# Patient Record
Sex: MALE | Race: WHITE
[De-identification: names, ages, dates, MRNs, and addresses within clinical notes are randomized per-mention and may not be internally consistent; named-entity substitution may affect disease eponyms.]

---

## 2020-05-05 NOTE — ULT
ABDOMINAL ULTRASOUND



HISTORY:

Lower abdominal pain and diarrhea



FINDINGS:



Liver: At the upper limits of normal in size measuring 17 cm in craniocaudal dimensions. Mildly coars
ened echotexture is present which may be attributable to fatty infiltration. No focal hepatic

lesion is seen.

Gallbladder: No gallbladder calculi are visualized. There is no gallbladder wall thickening or perich
olecystic fluid.



Common duct: Common duct is normal in caliber for patient's age measuring 0.6 cm in diameter.

Pancreas: Not well evaluated due to shadowing from bowel gas, but where visualized, the pancreas demo
nstrates a grossly normal sonographic appearance.

IVC: Limited visualized IVC has a normal sonographic appearance.

Aorta: Proximal abdominal aorta is obscured due to shadowing from bowel gas. The mid and distal abdom
inal aorta are normal in caliber.

Spleen: Within normal limits.

Kidneys: Kidneys demonstrate a normal sonographic appearance bilaterally with each kidney measuring a
pproximately 12 cm in length.



IMPRESSION:



1. Mildly coarsened echotexture of the liver suggesting fatty infiltration.

2. No gallbladder calculus is visualized, and the common duct is normal in caliber for the patient's 
age.



Reported By: Edu Cordoba 

Electronically Signed:  5/5/2020 8:27 AM

## 2020-06-05 NOTE — CT
CT ABDOMEN AND PELVIS WITH ORAL AND IV CONTRAST:

 

HISTORY: 

Lower abdominal pain and change in bowel habits.

 

COMPARISON: 

None.

 

FINDINGS: 

The lung bases are unremarkable.  No calcified gallstones are seen.  The liver, spleen, pancreas, adr
enal glands, and kidneys are normal.  No free air, free fluid, or lymphadenopathy is seen in the abdo
men or pelvis.

 

The small bowel loops are not abnormally dilated.  A normal-appearing appendix is present.  There is 
fecal material in the colon.  There are vascular calcifications without evidence of aneurysmal dilata
tion of the abdominal aorta.  There are degenerative changes in the spine.

 

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  There are artifacts from these metallic arthroplasties
 resulting in inadequate evaluation of the pelvic contents.  A penile pump is seen.  There are small 
fat-containing inguinal herniae.

 

IMPRESSION: 

No significant abnormalities are identified.

 

POS: [facility]

## 2021-08-29 ENCOUNTER — HOSPITAL ENCOUNTER (OUTPATIENT)
Dept: HOSPITAL 92 - CSHERS | Age: 76
Setting detail: OBSERVATION
LOS: 1 days | Discharge: HOME | End: 2021-08-30
Attending: HOSPITALIST | Admitting: FAMILY MEDICINE
Payer: MEDICARE

## 2021-08-29 VITALS — BODY MASS INDEX: 29 KG/M2

## 2021-08-29 DIAGNOSIS — Z79.01: ICD-10-CM

## 2021-08-29 DIAGNOSIS — Z85.46: ICD-10-CM

## 2021-08-29 DIAGNOSIS — Z79.890: ICD-10-CM

## 2021-08-29 DIAGNOSIS — Z86.73: ICD-10-CM

## 2021-08-29 DIAGNOSIS — E03.9: ICD-10-CM

## 2021-08-29 DIAGNOSIS — Z88.2: ICD-10-CM

## 2021-08-29 DIAGNOSIS — E87.6: ICD-10-CM

## 2021-08-29 DIAGNOSIS — I48.91: ICD-10-CM

## 2021-08-29 DIAGNOSIS — I10: ICD-10-CM

## 2021-08-29 DIAGNOSIS — N30.41: Primary | ICD-10-CM

## 2021-08-29 DIAGNOSIS — Z79.82: ICD-10-CM

## 2021-08-29 DIAGNOSIS — Z79.899: ICD-10-CM

## 2021-08-29 DIAGNOSIS — R33.8: ICD-10-CM

## 2021-08-29 PROCEDURE — 51703 INSERT BLADDER CATH COMPLEX: CPT

## 2021-08-29 PROCEDURE — G0378 HOSPITAL OBSERVATION PER HR: HCPCS

## 2021-08-29 PROCEDURE — 85025 COMPLETE CBC W/AUTO DIFF WBC: CPT

## 2021-08-29 PROCEDURE — 96375 TX/PRO/DX INJ NEW DRUG ADDON: CPT

## 2021-08-29 PROCEDURE — 80048 BASIC METABOLIC PNL TOTAL CA: CPT

## 2021-08-29 PROCEDURE — 96374 THER/PROPH/DIAG INJ IV PUSH: CPT

## 2021-08-30 VITALS — TEMPERATURE: 98.3 F | SYSTOLIC BLOOD PRESSURE: 121 MMHG | DIASTOLIC BLOOD PRESSURE: 75 MMHG

## 2021-08-30 LAB
ANION GAP SERPL CALC-SCNC: 11 MMOL/L (ref 10–20)
BASOPHILS # BLD AUTO: 0 10X3/UL (ref 0–0.2)
BASOPHILS NFR BLD AUTO: 0.2 % (ref 0–2)
BUN SERPL-MCNC: 15 MG/DL (ref 8.4–25.7)
CALCIUM SERPL-MCNC: 9.4 MG/DL (ref 7.8–10.44)
CHLORIDE SERPL-SCNC: 99 MMOL/L (ref 98–107)
CO2 SERPL-SCNC: 28 MMOL/L (ref 23–31)
CREAT CL PREDICTED SERPL C-G-VRATE: 116 ML/MIN (ref 70–130)
EOSINOPHIL # BLD AUTO: 0.2 10X3/UL (ref 0–0.5)
EOSINOPHIL NFR BLD AUTO: 2.1 % (ref 0–6)
GLUCOSE SERPL-MCNC: 125 MG/DL (ref 83–110)
HGB BLD-MCNC: 13.5 G/DL (ref 13.5–17.5)
LYMPHOCYTES NFR BLD AUTO: 11.8 % (ref 18–47)
MCH RBC QN AUTO: 30.6 PG (ref 27–33)
MCV RBC AUTO: 89.6 FL (ref 81.2–95.1)
MONOCYTES # BLD AUTO: 1.1 10X3/UL (ref 0–1.1)
MONOCYTES NFR BLD AUTO: 10.7 % (ref 0–10)
NEUTROPHILS # BLD AUTO: 8 10X3/UL (ref 1.5–8.4)
NEUTROPHILS NFR BLD AUTO: 74.7 % (ref 40–75)
PLATELET # BLD AUTO: 220 10X3/UL (ref 150–450)
POTASSIUM SERPL-SCNC: 3.1 MMOL/L (ref 3.5–5.1)
RBC # BLD AUTO: 4.41 10X6/UL (ref 4.32–5.72)
SODIUM SERPL-SCNC: 135 MMOL/L (ref 136–145)
WBC # BLD AUTO: 10.7 10X3/UL (ref 3.5–10.5)

## 2021-10-01 ENCOUNTER — HOSPITAL ENCOUNTER (OUTPATIENT)
Dept: HOSPITAL 92 - CSHWCC | Age: 76
Discharge: HOME | End: 2021-10-01
Attending: NURSE PRACTITIONER
Payer: MEDICARE

## 2021-10-01 DIAGNOSIS — N30.41: Primary | ICD-10-CM

## 2021-10-01 PROCEDURE — G0277 HBOT, FULL BODY CHAMBER, 30M: HCPCS

## 2021-10-01 PROCEDURE — 97139 UNLISTED THERAPEUTIC PX: CPT

## 2021-10-07 ENCOUNTER — HOSPITAL ENCOUNTER (OUTPATIENT)
Dept: HOSPITAL 92 - CSHWCC | Age: 76
Discharge: HOME | End: 2021-10-07
Attending: NURSE PRACTITIONER
Payer: MEDICARE

## 2021-10-07 DIAGNOSIS — N30.41: Primary | ICD-10-CM

## 2021-10-07 PROCEDURE — 97139 UNLISTED THERAPEUTIC PX: CPT

## 2021-10-07 PROCEDURE — G0277 HBOT, FULL BODY CHAMBER, 30M: HCPCS

## 2021-10-08 ENCOUNTER — HOSPITAL ENCOUNTER (OUTPATIENT)
Dept: HOSPITAL 92 - CSHWCC | Age: 76
Discharge: HOME | End: 2021-10-08
Attending: NURSE PRACTITIONER
Payer: MEDICARE

## 2021-10-08 DIAGNOSIS — N30.41: Primary | ICD-10-CM

## 2021-10-08 PROCEDURE — 97139 UNLISTED THERAPEUTIC PX: CPT

## 2021-10-08 PROCEDURE — G0277 HBOT, FULL BODY CHAMBER, 30M: HCPCS

## 2021-10-11 ENCOUNTER — HOSPITAL ENCOUNTER (OUTPATIENT)
Dept: HOSPITAL 92 - CSHWCC | Age: 76
Discharge: HOME | End: 2021-10-11
Attending: NURSE PRACTITIONER
Payer: MEDICARE

## 2021-10-11 DIAGNOSIS — N30.41: Primary | ICD-10-CM

## 2021-10-11 PROCEDURE — 97139 UNLISTED THERAPEUTIC PX: CPT

## 2021-10-11 PROCEDURE — G0277 HBOT, FULL BODY CHAMBER, 30M: HCPCS

## 2021-10-12 ENCOUNTER — HOSPITAL ENCOUNTER (OUTPATIENT)
Dept: HOSPITAL 92 - CSHWCC | Age: 76
Discharge: HOME | End: 2021-10-12
Attending: NURSE PRACTITIONER
Payer: MEDICARE

## 2021-10-12 DIAGNOSIS — N30.41: Primary | ICD-10-CM

## 2021-10-12 PROCEDURE — 97139 UNLISTED THERAPEUTIC PX: CPT

## 2021-10-12 PROCEDURE — G0277 HBOT, FULL BODY CHAMBER, 30M: HCPCS

## 2021-10-15 ENCOUNTER — HOSPITAL ENCOUNTER (OUTPATIENT)
Dept: HOSPITAL 92 - CSHWCC | Age: 76
Discharge: HOME | End: 2021-10-15
Attending: NURSE PRACTITIONER
Payer: MEDICARE

## 2021-10-15 DIAGNOSIS — N30.41: Primary | ICD-10-CM

## 2021-10-15 PROCEDURE — G0277 HBOT, FULL BODY CHAMBER, 30M: HCPCS

## 2021-10-15 PROCEDURE — 97139 UNLISTED THERAPEUTIC PX: CPT

## 2021-10-18 ENCOUNTER — HOSPITAL ENCOUNTER (OUTPATIENT)
Dept: HOSPITAL 92 - CSHWCC | Age: 76
Discharge: HOME | End: 2021-10-18
Attending: NURSE PRACTITIONER
Payer: MEDICARE

## 2021-10-18 DIAGNOSIS — N30.41: Primary | ICD-10-CM

## 2021-10-18 PROCEDURE — G0277 HBOT, FULL BODY CHAMBER, 30M: HCPCS

## 2021-10-18 PROCEDURE — 97139 UNLISTED THERAPEUTIC PX: CPT

## 2021-10-19 ENCOUNTER — HOSPITAL ENCOUNTER (OUTPATIENT)
Dept: HOSPITAL 92 - CSHWCC | Age: 76
Discharge: HOME | End: 2021-10-19
Attending: NURSE PRACTITIONER
Payer: MEDICARE

## 2021-10-19 DIAGNOSIS — N30.41: Primary | ICD-10-CM

## 2021-10-19 PROCEDURE — G0277 HBOT, FULL BODY CHAMBER, 30M: HCPCS

## 2021-10-19 PROCEDURE — 97139 UNLISTED THERAPEUTIC PX: CPT

## 2021-10-20 ENCOUNTER — HOSPITAL ENCOUNTER (OUTPATIENT)
Dept: HOSPITAL 92 - CSHWCC | Age: 76
Discharge: HOME | End: 2021-10-20
Attending: NURSE PRACTITIONER
Payer: MEDICARE

## 2021-10-20 DIAGNOSIS — N30.41: Primary | ICD-10-CM

## 2021-10-20 PROCEDURE — G0277 HBOT, FULL BODY CHAMBER, 30M: HCPCS

## 2021-10-20 PROCEDURE — 97139 UNLISTED THERAPEUTIC PX: CPT

## 2021-10-21 ENCOUNTER — HOSPITAL ENCOUNTER (OUTPATIENT)
Dept: HOSPITAL 92 - CSHWCC | Age: 76
Discharge: HOME | End: 2021-10-21
Attending: NURSE PRACTITIONER
Payer: MEDICARE

## 2021-10-21 DIAGNOSIS — N30.41: Primary | ICD-10-CM

## 2021-10-21 PROCEDURE — 97139 UNLISTED THERAPEUTIC PX: CPT

## 2021-10-21 PROCEDURE — G0277 HBOT, FULL BODY CHAMBER, 30M: HCPCS

## 2021-10-22 ENCOUNTER — HOSPITAL ENCOUNTER (OUTPATIENT)
Dept: HOSPITAL 92 - CSHWCC | Age: 76
Discharge: HOME | End: 2021-10-22
Attending: NURSE PRACTITIONER
Payer: MEDICARE

## 2021-10-22 DIAGNOSIS — N30.41: Primary | ICD-10-CM

## 2021-10-22 PROCEDURE — 97139 UNLISTED THERAPEUTIC PX: CPT

## 2021-10-22 PROCEDURE — G0277 HBOT, FULL BODY CHAMBER, 30M: HCPCS

## 2021-10-25 ENCOUNTER — HOSPITAL ENCOUNTER (OUTPATIENT)
Dept: HOSPITAL 92 - CSHWCC | Age: 76
Discharge: HOME | End: 2021-10-25
Attending: NURSE PRACTITIONER
Payer: MEDICARE

## 2021-10-25 DIAGNOSIS — N30.41: Primary | ICD-10-CM

## 2021-10-25 PROCEDURE — 97139 UNLISTED THERAPEUTIC PX: CPT

## 2021-10-25 PROCEDURE — G0277 HBOT, FULL BODY CHAMBER, 30M: HCPCS

## 2021-11-01 ENCOUNTER — HOSPITAL ENCOUNTER (OUTPATIENT)
Dept: HOSPITAL 92 - CSHWCC | Age: 76
Discharge: HOME | End: 2021-11-01
Attending: NURSE PRACTITIONER
Payer: MEDICARE

## 2021-11-01 DIAGNOSIS — N30.41: Primary | ICD-10-CM

## 2021-11-01 PROCEDURE — G0277 HBOT, FULL BODY CHAMBER, 30M: HCPCS

## 2021-11-02 ENCOUNTER — HOSPITAL ENCOUNTER (OUTPATIENT)
Dept: HOSPITAL 92 - CSHWCC | Age: 76
Discharge: HOME | End: 2021-11-02
Attending: NURSE PRACTITIONER
Payer: MEDICARE

## 2021-11-02 DIAGNOSIS — N30.41: Primary | ICD-10-CM

## 2021-11-02 PROCEDURE — 97139 UNLISTED THERAPEUTIC PX: CPT

## 2021-11-02 PROCEDURE — G0277 HBOT, FULL BODY CHAMBER, 30M: HCPCS

## 2021-11-03 ENCOUNTER — HOSPITAL ENCOUNTER (OUTPATIENT)
Dept: HOSPITAL 92 - CSHWCC | Age: 76
Discharge: HOME | End: 2021-11-03
Attending: NURSE PRACTITIONER
Payer: MEDICARE

## 2021-11-03 DIAGNOSIS — N30.41: Primary | ICD-10-CM

## 2021-11-05 ENCOUNTER — HOSPITAL ENCOUNTER (OUTPATIENT)
Dept: HOSPITAL 92 - CSHWCC | Age: 76
Discharge: HOME | End: 2021-11-05
Attending: NURSE PRACTITIONER
Payer: MEDICARE

## 2021-11-05 DIAGNOSIS — N30.41: Primary | ICD-10-CM

## 2021-11-05 PROCEDURE — G0277 HBOT, FULL BODY CHAMBER, 30M: HCPCS

## 2021-11-05 PROCEDURE — 97139 UNLISTED THERAPEUTIC PX: CPT

## 2021-11-08 ENCOUNTER — HOSPITAL ENCOUNTER (OUTPATIENT)
Dept: HOSPITAL 92 - CSHWCC | Age: 76
Discharge: HOME | End: 2021-11-08
Attending: NURSE PRACTITIONER
Payer: MEDICARE

## 2021-11-08 DIAGNOSIS — N30.41: Primary | ICD-10-CM

## 2021-11-08 PROCEDURE — G0277 HBOT, FULL BODY CHAMBER, 30M: HCPCS

## 2021-11-09 ENCOUNTER — HOSPITAL ENCOUNTER (OUTPATIENT)
Dept: HOSPITAL 92 - CSHWCC | Age: 76
Discharge: HOME | End: 2021-11-09
Attending: NURSE PRACTITIONER
Payer: MEDICARE

## 2021-11-09 DIAGNOSIS — N30.41: Primary | ICD-10-CM

## 2021-11-09 PROCEDURE — G0277 HBOT, FULL BODY CHAMBER, 30M: HCPCS

## 2021-11-09 PROCEDURE — 97139 UNLISTED THERAPEUTIC PX: CPT

## 2021-11-10 ENCOUNTER — HOSPITAL ENCOUNTER (OUTPATIENT)
Dept: HOSPITAL 92 - CSHWCC | Age: 76
Discharge: HOME | End: 2021-11-10
Attending: NURSE PRACTITIONER
Payer: MEDICARE

## 2021-11-10 DIAGNOSIS — N30.41: Primary | ICD-10-CM

## 2021-11-10 PROCEDURE — G0277 HBOT, FULL BODY CHAMBER, 30M: HCPCS

## 2021-11-11 ENCOUNTER — HOSPITAL ENCOUNTER (OUTPATIENT)
Dept: HOSPITAL 92 - CSHWCC | Age: 76
Discharge: HOME | End: 2021-11-11
Attending: NURSE PRACTITIONER
Payer: MEDICARE

## 2021-11-11 DIAGNOSIS — N30.41: Primary | ICD-10-CM

## 2021-11-12 ENCOUNTER — HOSPITAL ENCOUNTER (OUTPATIENT)
Dept: HOSPITAL 92 - CSHWCC | Age: 76
Discharge: HOME | End: 2021-11-12
Attending: NURSE PRACTITIONER
Payer: MEDICARE

## 2021-11-12 DIAGNOSIS — N30.41: Primary | ICD-10-CM

## 2021-11-12 PROCEDURE — G0277 HBOT, FULL BODY CHAMBER, 30M: HCPCS

## 2021-11-12 PROCEDURE — 97139 UNLISTED THERAPEUTIC PX: CPT

## 2021-11-15 ENCOUNTER — HOSPITAL ENCOUNTER (OUTPATIENT)
Dept: HOSPITAL 92 - CSHWCC | Age: 76
Discharge: HOME | End: 2021-11-15
Attending: NURSE PRACTITIONER
Payer: MEDICARE

## 2021-11-15 DIAGNOSIS — N30.41: Primary | ICD-10-CM

## 2021-11-15 PROCEDURE — G0277 HBOT, FULL BODY CHAMBER, 30M: HCPCS

## 2021-11-16 ENCOUNTER — HOSPITAL ENCOUNTER (OUTPATIENT)
Dept: HOSPITAL 92 - CSHWCC | Age: 76
Discharge: HOME | End: 2021-11-16
Attending: NURSE PRACTITIONER
Payer: MEDICARE

## 2021-11-16 DIAGNOSIS — N30.41: Primary | ICD-10-CM

## 2021-11-16 PROCEDURE — G0277 HBOT, FULL BODY CHAMBER, 30M: HCPCS

## 2021-11-17 ENCOUNTER — HOSPITAL ENCOUNTER (OUTPATIENT)
Dept: HOSPITAL 92 - CSHWCC | Age: 76
Discharge: HOME | End: 2021-11-17
Attending: NURSE PRACTITIONER
Payer: MEDICARE

## 2021-11-17 DIAGNOSIS — N30.41: Primary | ICD-10-CM

## 2021-11-17 PROCEDURE — 97139 UNLISTED THERAPEUTIC PX: CPT

## 2021-11-17 PROCEDURE — G0277 HBOT, FULL BODY CHAMBER, 30M: HCPCS

## 2021-11-18 ENCOUNTER — HOSPITAL ENCOUNTER (OUTPATIENT)
Dept: HOSPITAL 92 - CSHWCC | Age: 76
Discharge: HOME | End: 2021-11-18
Attending: NURSE PRACTITIONER
Payer: MEDICARE

## 2021-11-18 DIAGNOSIS — N30.41: Primary | ICD-10-CM

## 2021-11-18 PROCEDURE — G0277 HBOT, FULL BODY CHAMBER, 30M: HCPCS

## 2021-11-19 ENCOUNTER — HOSPITAL ENCOUNTER (OUTPATIENT)
Dept: HOSPITAL 92 - CSHWCC | Age: 76
Discharge: HOME | End: 2021-11-19
Attending: NURSE PRACTITIONER
Payer: MEDICARE

## 2021-11-19 DIAGNOSIS — N30.41: Primary | ICD-10-CM

## 2021-11-19 PROCEDURE — G0463 HOSPITAL OUTPT CLINIC VISIT: HCPCS

## 2021-11-19 PROCEDURE — 99211 OFF/OP EST MAY X REQ PHY/QHP: CPT

## 2021-11-19 PROCEDURE — 97139 UNLISTED THERAPEUTIC PX: CPT

## 2021-11-22 ENCOUNTER — HOSPITAL ENCOUNTER (OUTPATIENT)
Dept: HOSPITAL 92 - CSHWCC | Age: 76
Discharge: HOME | End: 2021-11-22
Attending: NURSE PRACTITIONER
Payer: MEDICARE

## 2021-11-22 DIAGNOSIS — N30.41: Primary | ICD-10-CM

## 2021-11-22 PROCEDURE — G0277 HBOT, FULL BODY CHAMBER, 30M: HCPCS

## 2021-11-23 ENCOUNTER — HOSPITAL ENCOUNTER (OUTPATIENT)
Dept: HOSPITAL 92 - CSHWCC | Age: 76
Discharge: HOME | End: 2021-11-23
Attending: NURSE PRACTITIONER
Payer: MEDICARE

## 2021-11-23 DIAGNOSIS — N30.41: Primary | ICD-10-CM

## 2021-11-24 ENCOUNTER — HOSPITAL ENCOUNTER (OUTPATIENT)
Dept: HOSPITAL 92 - CSHWCC | Age: 76
Discharge: HOME | End: 2021-11-24
Attending: NURSE PRACTITIONER
Payer: MEDICARE

## 2021-11-24 DIAGNOSIS — N30.41: Primary | ICD-10-CM

## 2021-11-24 PROCEDURE — G0277 HBOT, FULL BODY CHAMBER, 30M: HCPCS

## 2021-11-29 ENCOUNTER — HOSPITAL ENCOUNTER (OUTPATIENT)
Dept: HOSPITAL 92 - CSHWCC | Age: 76
Discharge: HOME | End: 2021-11-29
Attending: NURSE PRACTITIONER
Payer: MEDICARE

## 2021-11-29 DIAGNOSIS — N30.41: Primary | ICD-10-CM

## 2021-11-29 PROCEDURE — 97139 UNLISTED THERAPEUTIC PX: CPT

## 2021-11-29 PROCEDURE — G0277 HBOT, FULL BODY CHAMBER, 30M: HCPCS

## 2021-11-30 ENCOUNTER — HOSPITAL ENCOUNTER (OUTPATIENT)
Dept: HOSPITAL 92 - CSHWCC | Age: 76
Discharge: HOME | End: 2021-11-30
Attending: NURSE PRACTITIONER
Payer: MEDICARE

## 2021-11-30 DIAGNOSIS — N30.41: Primary | ICD-10-CM

## 2021-11-30 PROCEDURE — 97139 UNLISTED THERAPEUTIC PX: CPT

## 2021-11-30 PROCEDURE — G0277 HBOT, FULL BODY CHAMBER, 30M: HCPCS

## 2021-12-01 ENCOUNTER — HOSPITAL ENCOUNTER (OUTPATIENT)
Dept: HOSPITAL 92 - CSHWCC | Age: 76
Discharge: HOME | End: 2021-12-01
Attending: NURSE PRACTITIONER
Payer: MEDICARE

## 2021-12-01 DIAGNOSIS — N30.41: Primary | ICD-10-CM

## 2021-12-02 ENCOUNTER — HOSPITAL ENCOUNTER (OUTPATIENT)
Dept: HOSPITAL 92 - CSHWCC | Age: 76
Discharge: HOME | End: 2021-12-02
Attending: NURSE PRACTITIONER
Payer: MEDICARE

## 2021-12-02 DIAGNOSIS — N30.41: Primary | ICD-10-CM

## 2021-12-02 PROCEDURE — 97139 UNLISTED THERAPEUTIC PX: CPT

## 2021-12-02 PROCEDURE — G0277 HBOT, FULL BODY CHAMBER, 30M: HCPCS

## 2021-12-03 ENCOUNTER — HOSPITAL ENCOUNTER (OUTPATIENT)
Dept: HOSPITAL 92 - CSHWCC | Age: 76
Discharge: HOME | End: 2021-12-03
Attending: NURSE PRACTITIONER
Payer: MEDICARE

## 2021-12-03 DIAGNOSIS — N30.41: Primary | ICD-10-CM

## 2021-12-03 PROCEDURE — G0277 HBOT, FULL BODY CHAMBER, 30M: HCPCS

## 2021-12-03 PROCEDURE — 97139 UNLISTED THERAPEUTIC PX: CPT

## 2021-12-06 ENCOUNTER — HOSPITAL ENCOUNTER (OUTPATIENT)
Dept: HOSPITAL 92 - CSHWCC | Age: 76
Discharge: HOME | End: 2021-12-06
Attending: NURSE PRACTITIONER
Payer: MEDICARE

## 2021-12-06 DIAGNOSIS — N30.41: Primary | ICD-10-CM

## 2021-12-10 ENCOUNTER — HOSPITAL ENCOUNTER (OUTPATIENT)
Dept: HOSPITAL 92 - CSHULT | Age: 76
Discharge: HOME | End: 2021-12-10
Attending: UROLOGY
Payer: MEDICARE

## 2021-12-10 DIAGNOSIS — N28.1: Primary | ICD-10-CM

## 2021-12-10 PROCEDURE — 76770 US EXAM ABDO BACK WALL COMP: CPT

## 2022-01-12 ENCOUNTER — HOSPITAL ENCOUNTER (EMERGENCY)
Dept: HOSPITAL 92 - CSHERS | Age: 77
LOS: 1 days | Discharge: HOME | End: 2022-01-13
Payer: MEDICARE

## 2022-01-12 DIAGNOSIS — R10.33: Primary | ICD-10-CM

## 2022-01-12 LAB
ALBUMIN SERPL BCG-MCNC: 4.3 G/DL (ref 3.4–4.8)
ALP SERPL-CCNC: 65 U/L (ref 40–110)
ALT SERPL W P-5'-P-CCNC: 13 U/L (ref 8–55)
ANION GAP SERPL CALC-SCNC: 15 MMOL/L (ref 10–20)
AST SERPL-CCNC: 18 U/L (ref 5–34)
BASOPHILS # BLD AUTO: 0 10X3/UL (ref 0–0.2)
BASOPHILS NFR BLD AUTO: 0.4 % (ref 0–2)
BILIRUB SERPL-MCNC: 0.4 MG/DL (ref 0.2–1.2)
BUN SERPL-MCNC: 21 MG/DL (ref 8.4–25.7)
CALCIUM SERPL-MCNC: 9 MG/DL (ref 7.8–10.44)
CHLORIDE SERPL-SCNC: 103 MMOL/L (ref 98–107)
CO2 SERPL-SCNC: 24 MMOL/L (ref 23–31)
CREAT CL PREDICTED SERPL C-G-VRATE: 0 ML/MIN (ref 70–130)
EOSINOPHIL # BLD AUTO: 0.2 10X3/UL (ref 0–0.5)
EOSINOPHIL NFR BLD AUTO: 2.3 % (ref 0–6)
GLOBULIN SER CALC-MCNC: 2.1 G/DL (ref 2.4–3.5)
GLUCOSE SERPL-MCNC: 127 MG/DL (ref 83–110)
HGB BLD-MCNC: 14.6 G/DL (ref 13.5–17.5)
LYMPHOCYTES NFR BLD AUTO: 17.8 % (ref 18–47)
MCH RBC QN AUTO: 30.1 PG (ref 27–33)
MCV RBC AUTO: 91.5 FL (ref 81.2–95.1)
MONOCYTES # BLD AUTO: 0.8 10X3/UL (ref 0–1.1)
MONOCYTES NFR BLD AUTO: 10.4 % (ref 0–10)
NEUTROPHILS # BLD AUTO: 5.5 10X3/UL (ref 1.5–8.4)
NEUTROPHILS NFR BLD AUTO: 68.7 % (ref 40–75)
PLATELET # BLD AUTO: 235 10X3/UL (ref 150–450)
POTASSIUM SERPL-SCNC: 3.9 MMOL/L (ref 3.5–5.1)
RBC # BLD AUTO: 4.85 10X6/UL (ref 4.32–5.72)
SODIUM SERPL-SCNC: 138 MMOL/L (ref 136–145)
WBC # BLD AUTO: 8 10X3/UL (ref 3.5–10.5)

## 2022-01-12 PROCEDURE — 85025 COMPLETE CBC W/AUTO DIFF WBC: CPT

## 2022-01-12 PROCEDURE — 84484 ASSAY OF TROPONIN QUANT: CPT

## 2022-01-12 PROCEDURE — 80053 COMPREHEN METABOLIC PANEL: CPT

## 2022-01-12 PROCEDURE — 71045 X-RAY EXAM CHEST 1 VIEW: CPT

## 2022-01-12 PROCEDURE — 93005 ELECTROCARDIOGRAM TRACING: CPT

## 2022-05-02 ENCOUNTER — HOSPITAL ENCOUNTER (OUTPATIENT)
Dept: HOSPITAL 92 - SLEEPLAB | Age: 77
Discharge: HOME | End: 2022-05-02
Attending: INTERNAL MEDICINE
Payer: MEDICARE

## 2022-05-02 DIAGNOSIS — I10: ICD-10-CM

## 2022-05-02 DIAGNOSIS — R53.83: ICD-10-CM

## 2022-05-02 DIAGNOSIS — R06.83: ICD-10-CM

## 2022-05-02 DIAGNOSIS — G47.10: ICD-10-CM

## 2022-05-02 DIAGNOSIS — G47.31: ICD-10-CM

## 2022-05-02 DIAGNOSIS — K21.9: ICD-10-CM

## 2022-05-02 DIAGNOSIS — G45.9: ICD-10-CM

## 2022-05-02 DIAGNOSIS — F32.9: ICD-10-CM

## 2022-05-02 DIAGNOSIS — G47.00: ICD-10-CM

## 2022-05-02 DIAGNOSIS — I48.91: ICD-10-CM

## 2022-05-02 DIAGNOSIS — G47.33: Primary | ICD-10-CM

## 2022-05-02 PROCEDURE — 95810 POLYSOM 6/> YRS 4/> PARAM: CPT

## 2022-06-22 ENCOUNTER — HOSPITAL ENCOUNTER (OUTPATIENT)
Dept: HOSPITAL 92 - SLEEPLAB | Age: 77
Discharge: HOME | End: 2022-06-22
Attending: INTERNAL MEDICINE
Payer: MEDICARE

## 2022-06-22 DIAGNOSIS — G47.33: Primary | ICD-10-CM

## 2022-06-22 DIAGNOSIS — F32.A: ICD-10-CM

## 2022-06-22 DIAGNOSIS — I10: ICD-10-CM

## 2022-06-22 DIAGNOSIS — R53.83: ICD-10-CM

## 2022-06-22 DIAGNOSIS — R06.83: ICD-10-CM

## 2022-06-22 DIAGNOSIS — G45.9: ICD-10-CM

## 2022-06-22 PROCEDURE — 95811 POLYSOM 6/>YRS CPAP 4/> PARM: CPT

## 2022-08-12 ENCOUNTER — HOSPITAL ENCOUNTER (OUTPATIENT)
Dept: HOSPITAL 92 - CSHCT | Age: 77
Discharge: HOME | End: 2022-08-12
Attending: INTERNAL MEDICINE
Payer: MEDICARE

## 2022-08-12 DIAGNOSIS — K40.90: ICD-10-CM

## 2022-08-12 DIAGNOSIS — R10.31: Primary | ICD-10-CM

## 2022-08-12 LAB — EGFRCR SERPLBLD CKD-EPI 2021: 92 ML/MIN/{1.73_M2}

## 2022-08-12 PROCEDURE — 74177 CT ABD & PELVIS W/CONTRAST: CPT

## 2022-08-12 PROCEDURE — 82565 ASSAY OF CREATININE: CPT

## 2022-09-02 ENCOUNTER — HOSPITAL ENCOUNTER (OUTPATIENT)
Dept: HOSPITAL 92 - CSHMRI | Age: 77
Discharge: HOME | End: 2022-09-02
Attending: ORTHOPAEDIC SURGERY
Payer: MEDICARE

## 2022-09-02 DIAGNOSIS — M47.819: ICD-10-CM

## 2022-09-02 DIAGNOSIS — M51.37: Primary | ICD-10-CM

## 2022-09-02 PROCEDURE — 72148 MRI LUMBAR SPINE W/O DYE: CPT

## 2022-09-22 ENCOUNTER — HOSPITAL ENCOUNTER (OUTPATIENT)
Dept: HOSPITAL 92 - SDC | Age: 77
Discharge: HOME | End: 2022-09-22
Attending: UROLOGY
Payer: MEDICARE

## 2022-09-22 VITALS — BODY MASS INDEX: 27.1 KG/M2

## 2022-09-22 DIAGNOSIS — N32.81: Primary | ICD-10-CM

## 2022-09-22 DIAGNOSIS — Z79.890: ICD-10-CM

## 2022-09-22 DIAGNOSIS — K21.9: ICD-10-CM

## 2022-09-22 DIAGNOSIS — E78.00: ICD-10-CM

## 2022-09-22 DIAGNOSIS — Z79.01: ICD-10-CM

## 2022-09-22 DIAGNOSIS — Z92.3: ICD-10-CM

## 2022-09-22 DIAGNOSIS — R73.03: ICD-10-CM

## 2022-09-22 DIAGNOSIS — R39.12: ICD-10-CM

## 2022-09-22 DIAGNOSIS — Z87.891: ICD-10-CM

## 2022-09-22 DIAGNOSIS — N30.40: ICD-10-CM

## 2022-09-22 DIAGNOSIS — Z88.2: ICD-10-CM

## 2022-09-22 DIAGNOSIS — I10: ICD-10-CM

## 2022-09-22 DIAGNOSIS — N28.1: ICD-10-CM

## 2022-09-22 DIAGNOSIS — I48.0: ICD-10-CM

## 2022-09-22 DIAGNOSIS — N39.41: ICD-10-CM

## 2022-09-22 DIAGNOSIS — M19.90: ICD-10-CM

## 2022-09-22 DIAGNOSIS — E03.9: ICD-10-CM

## 2022-09-22 DIAGNOSIS — Z85.46: ICD-10-CM

## 2022-09-22 DIAGNOSIS — Z79.899: ICD-10-CM

## 2022-09-22 PROCEDURE — 76000 FLUOROSCOPY <1 HR PHYS/QHP: CPT

## 2022-09-22 PROCEDURE — 72220 X-RAY EXAM SACRUM TAILBONE: CPT

## 2022-09-22 PROCEDURE — S0020 INJECTION, BUPIVICAINE HYDRO: HCPCS

## 2022-09-22 PROCEDURE — C1897 LEAD, NEUROSTIM TEST KIT: HCPCS

## 2022-09-22 PROCEDURE — 01HY3MZ INSERTION OF NEUROSTIMULATOR LEAD INTO PERIPHERAL NERVE, PERCUTANEOUS APPROACH: ICD-10-PCS | Performed by: UROLOGY

## 2022-09-27 ENCOUNTER — HOSPITAL ENCOUNTER (OUTPATIENT)
Dept: HOSPITAL 92 - LABBT | Age: 77
Discharge: HOME | End: 2022-09-27
Attending: UROLOGY
Payer: MEDICARE

## 2022-09-27 DIAGNOSIS — Z20.822: Primary | ICD-10-CM

## 2022-09-27 PROCEDURE — 87811 SARS-COV-2 COVID19 W/OPTIC: CPT

## 2022-11-12 ENCOUNTER — HOSPITAL ENCOUNTER (EMERGENCY)
Dept: HOSPITAL 92 - CSHERS | Age: 77
Discharge: HOME | End: 2022-11-12
Payer: COMMERCIAL

## 2022-11-12 DIAGNOSIS — I10: ICD-10-CM

## 2022-11-12 DIAGNOSIS — S16.1XXA: Primary | ICD-10-CM
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
## 2022-11-12 DIAGNOSIS — E78.5: ICD-10-CM

## 2022-11-12 PROCEDURE — 99283 EMERGENCY DEPT VISIT LOW MDM: CPT
